# Patient Record
Sex: FEMALE | Race: WHITE | Employment: FULL TIME | URBAN - METROPOLITAN AREA
[De-identification: names, ages, dates, MRNs, and addresses within clinical notes are randomized per-mention and may not be internally consistent; named-entity substitution may affect disease eponyms.]

---

## 2021-04-09 ENCOUNTER — OFFICE VISIT (OUTPATIENT)
Dept: FAMILY MEDICINE CLINIC | Facility: CLINIC | Age: 31
End: 2021-04-09
Payer: COMMERCIAL

## 2021-04-09 VITALS
HEIGHT: 68.75 IN | OXYGEN SATURATION: 99 % | HEART RATE: 69 BPM | RESPIRATION RATE: 16 BRPM | DIASTOLIC BLOOD PRESSURE: 64 MMHG | SYSTOLIC BLOOD PRESSURE: 96 MMHG | BODY MASS INDEX: 19.99 KG/M2 | WEIGHT: 135 LBS

## 2021-04-09 DIAGNOSIS — N63.10 BREAST MASS, RIGHT: Primary | ICD-10-CM

## 2021-04-09 PROCEDURE — 99203 OFFICE O/P NEW LOW 30 MIN: CPT | Performed by: FAMILY MEDICINE

## 2021-04-09 PROCEDURE — 3074F SYST BP LT 130 MM HG: CPT | Performed by: FAMILY MEDICINE

## 2021-04-09 PROCEDURE — 3078F DIAST BP <80 MM HG: CPT | Performed by: FAMILY MEDICINE

## 2021-04-09 PROCEDURE — 3008F BODY MASS INDEX DOCD: CPT | Performed by: FAMILY MEDICINE

## 2021-04-09 RX ORDER — CLOBETASOL PROPIONATE 0.05 G/100ML
SHAMPOO TOPICAL
COMMUNITY
Start: 2020-06-24

## 2021-04-09 RX ORDER — KETOCONAZOLE 20 MG/ML
SHAMPOO TOPICAL
COMMUNITY
Start: 2020-05-22 | End: 2021-04-20

## 2021-04-09 RX ORDER — ESCITALOPRAM OXALATE 5 MG/1
TABLET ORAL
COMMUNITY
Start: 2021-01-14

## 2021-04-09 RX ORDER — ALPRAZOLAM 0.5 MG/1
0.5 TABLET ORAL 2 TIMES DAILY PRN
Qty: 20 TABLET | Refills: 0 | Status: SHIPPED | OUTPATIENT
Start: 2021-04-09 | End: 2021-04-20 | Stop reason: ALTCHOICE

## 2021-04-09 RX ORDER — SPIRONOLACTONE 50 MG/1
TABLET, FILM COATED ORAL
COMMUNITY
Start: 2019-08-20

## 2021-04-09 NOTE — PROGRESS NOTES
HPI:   Berry Martínez is a 26year old female who presents with right breast mass    PCOS- irregular   FDLMP end of February     Right breast pain noted today and pt noted breast mass upon self exam today   No discharge   No fever   No family h/o breast between 4-6 oclcock with overlying warmth and redness   MUSCULOSKELETAL: back is not tender,FROM of the back  EXTREMITIES: no cyanosis, clubbing or edema  NEURO: cranial nerves are intact,motor and sensory are grossly intact    ASSESSMENT AND PLAN:   Ricardo Isbell

## 2021-04-12 ENCOUNTER — HOSPITAL ENCOUNTER (OUTPATIENT)
Dept: MAMMOGRAPHY | Facility: HOSPITAL | Age: 31
Discharge: HOME | End: 2021-04-12
Attending: FAMILY MEDICINE
Payer: COMMERCIAL

## 2021-04-12 DIAGNOSIS — R92.8 ABNORMAL MAMMOGRAM OF RIGHT BREAST: ICD-10-CM

## 2021-04-12 DIAGNOSIS — N63.0 BREAST MASS: ICD-10-CM

## 2021-04-12 DIAGNOSIS — N63.10 BREAST MASS, RIGHT: ICD-10-CM

## 2021-04-12 DIAGNOSIS — R22.31 MASS OF AXILLA, RIGHT: ICD-10-CM

## 2021-04-12 PROCEDURE — 76641 ULTRASOUND BREAST COMPLETE: CPT | Performed by: FAMILY MEDICINE

## 2021-04-12 PROCEDURE — 88305 TISSUE EXAM BY PATHOLOGIST: CPT | Performed by: FAMILY MEDICINE

## 2021-04-12 PROCEDURE — 88341 IMHCHEM/IMCYTCHM EA ADD ANTB: CPT | Performed by: FAMILY MEDICINE

## 2021-04-12 PROCEDURE — 77066 DX MAMMO INCL CAD BI: CPT | Performed by: FAMILY MEDICINE

## 2021-04-12 PROCEDURE — 77062 BREAST TOMOSYNTHESIS BI: CPT | Performed by: FAMILY MEDICINE

## 2021-04-12 PROCEDURE — 38505 NEEDLE BIOPSY LYMPH NODES: CPT | Performed by: FAMILY MEDICINE

## 2021-04-12 PROCEDURE — 76942 ECHO GUIDE FOR BIOPSY: CPT | Performed by: FAMILY MEDICINE

## 2021-04-12 PROCEDURE — 19083 BX BREAST 1ST LESION US IMAG: CPT | Performed by: FAMILY MEDICINE

## 2021-04-12 PROCEDURE — 88365 INSITU HYBRIDIZATION (FISH): CPT | Performed by: FAMILY MEDICINE

## 2021-04-12 PROCEDURE — 88342 IMHCHEM/IMCYTCHM 1ST ANTB: CPT | Performed by: FAMILY MEDICINE

## 2021-04-12 PROCEDURE — 77065 DX MAMMO INCL CAD UNI: CPT | Performed by: FAMILY MEDICINE

## 2021-04-12 NOTE — IMAGING NOTE
Ole Montiel is recommended for an ultrasound guided biopsy of the Right breast and Right axilla by .     History obtained:    INDICATIONS:  N63.10 Breast mass, right       33-year-old female presents for baseline mammography with complaint of remainder of the left breast reveals normal appearing breast tissue.       Left axilla:  Morphologically normal appearing lymph nodes are seen.                          Impression   CONCLUSION:     1. At the site of the patient's palpable area in the right MOUTH EVERY DAY. DO NOT GET PREGNANT WHILE ON THIS MEDICATION*     • ALPRAZolam (XANAX) 0.5 MG Oral Tab Take 1 tablet (0.5 mg total) by mouth 2 (two) times daily as needed for Anxiety.  20 tablet 0       Procedure explained to Foody and her husb

## 2021-04-14 ENCOUNTER — TELEPHONE (OUTPATIENT)
Dept: MAMMOGRAPHY | Facility: HOSPITAL | Age: 31
End: 2021-04-14

## 2021-04-14 RX ORDER — SULFAMETHOXAZOLE AND TRIMETHOPRIM 800; 160 MG/1; MG/1
1 TABLET ORAL 2 TIMES DAILY
Qty: 14 TABLET | Refills: 0 | Status: SHIPPED | OUTPATIENT
Start: 2021-04-14 | End: 2021-04-21

## 2021-04-14 NOTE — TELEPHONE ENCOUNTER
Telephoned Gwenith Haff and name,  verified with patient. Notified Gwenith Haff of right breast 2 site negative for malignancy biopsy result. Concordance pending. Gwenith Haff reports biopsy site is healing well.   Radiologist recommends n

## 2021-04-14 NOTE — TELEPHONE ENCOUNTER
Telephoned Madelin Junk and name,  verified with patient. Notified Madelin Ajayk of right breast 2 site negative for malignancy but positive for abscess biopsy result. Patient denies any current breast symptoms or fever.   Concordance verified b

## 2021-04-15 ENCOUNTER — TELEPHONE (OUTPATIENT)
Dept: GENERAL RADIOLOGY | Facility: HOSPITAL | Age: 31
End: 2021-04-15

## 2021-04-15 NOTE — TELEPHONE ENCOUNTER
0940: Post biopsy follow-up  Spoke with Susy Berry post Ultrasound Guided right breast and right axillary node biopsy. Mrs. Mccarty aware of pathology. No questions at this time. Mrs. Mccarty reported plan of care to include antibiotic trial-sulfa

## 2021-04-16 ENCOUNTER — TELEPHONE (OUTPATIENT)
Dept: GENERAL RADIOLOGY | Facility: HOSPITAL | Age: 31
End: 2021-04-16

## 2021-04-16 NOTE — TELEPHONE ENCOUNTER
0915: Returned Niki Johnsonmika Mccarty's call at this time. Discussed concerns regarding \"dizzy\" sensation upon rising this morning. Mrs. Mccarty began antibiotic treatment yesterday. Instructed to maintain good hydration and nutrition. Instructed to follow-up with Dr. Emiliano Mobley   should symptoms persist or worsen. Mrs. Mccarty verbalized understanding and agreement.

## 2021-04-20 ENCOUNTER — OFFICE VISIT (OUTPATIENT)
Dept: SURGERY | Facility: CLINIC | Age: 31
End: 2021-04-20
Payer: COMMERCIAL

## 2021-04-20 ENCOUNTER — OFFICE VISIT (OUTPATIENT)
Dept: HEMATOLOGY/ONCOLOGY | Facility: HOSPITAL | Age: 31
End: 2021-04-20
Attending: SURGERY
Payer: COMMERCIAL

## 2021-04-20 VITALS
WEIGHT: 134 LBS | TEMPERATURE: 98 F | BODY MASS INDEX: 19.85 KG/M2 | HEIGHT: 68.74 IN | OXYGEN SATURATION: 99 % | SYSTOLIC BLOOD PRESSURE: 99 MMHG | HEART RATE: 58 BPM | DIASTOLIC BLOOD PRESSURE: 66 MMHG | RESPIRATION RATE: 16 BRPM

## 2021-04-20 DIAGNOSIS — N61.1 ABSCESS OF RIGHT BREAST: Primary | ICD-10-CM

## 2021-04-20 PROCEDURE — 99243 OFF/OP CNSLTJ NEW/EST LOW 30: CPT | Performed by: SURGERY

## 2021-04-20 PROCEDURE — 99211 OFF/OP EST MAY X REQ PHY/QHP: CPT

## 2021-04-20 NOTE — PROGRESS NOTES
Patient is here today for surgical consult  with Dr. Tay Blair / Zbigniew Monsalve. Patient Denies pain. Medication list and medical history were reviewed and updated.     Education Record    Learner:  Patient and friend     Disease / Diagnosis: Zbigniew Monsalve /

## 2021-04-24 PROBLEM — N61.1 ABSCESS OF RIGHT BREAST: Status: ACTIVE | Noted: 2021-04-24

## 2021-04-24 NOTE — H&P
New Patient Visit Note       Active Problems      1.  Abscess of right breast        Chief Complaint   Right breast infection      History of Present Illness   The patient is a 18-year-old female seen at the request of her primary care physician regarding a rinse in shower, use up to BIW, Disp: , Rfl:   •  escitalopram 5 MG Oral Tab, TAKE 1 TABLET BY MOUTH EVERY DAY, Disp: , Rfl:   •  spironolactone 50 MG Oral Tab, TAKE 1 TABLET BY MOUTH EVERY DAY.  DO NOT GET PREGNANT WHILE ON THIS MEDICATION*, Disp: , Rfl: No decreased breath sounds, wheezing, rhonchi or rales. Chest:      Chest wall: No mass. Breasts: Breasts are symmetrical.         Right: Mass, skin change and tenderness present. No inverted nipple or nipple discharge.          Left: No inverted nip Electa Irons is a round circumscribed anechoic simple cyst measuring 0.6 x 0.6 x 0.5 cm.  This is benign.       9 o'clock position 4 cm from the nipple, site of palpable area: Electa Irons is an irregular shaped non circumscribed hypoechoic mass measuring 2.3 x 2.0 x ULTRASOUND-GUIDED BIOPSY: RIGHT BREAST and AXILLA                   A letter explaining the results in lay terms has been sent to the patient.  This exam was evaluated with a computer-aided device.  This patient's information has been entered into a bj

## 2021-05-18 ENCOUNTER — APPOINTMENT (OUTPATIENT)
Dept: HEMATOLOGY/ONCOLOGY | Facility: HOSPITAL | Age: 31
End: 2021-05-18
Attending: SURGERY
Payer: COMMERCIAL

## 2024-01-23 ENCOUNTER — TELEPHONE (OUTPATIENT)
Dept: FAMILY MEDICINE CLINIC | Facility: CLINIC | Age: 34
End: 2024-01-23

## 2024-01-23 RX ORDER — OSELTAMIVIR PHOSPHATE 75 MG/1
75 CAPSULE ORAL 2 TIMES DAILY
Qty: 10 CAPSULE | Refills: 0 | Status: SHIPPED | OUTPATIENT
Start: 2024-01-23

## (undated) NOTE — Clinical Note
I had the pleasure of seeing Ole Portal on 4/20/2021. Please see my attached note.     Lis Singh MD FACS  [facility]